# Patient Record
Sex: FEMALE | Race: WHITE | NOT HISPANIC OR LATINO
[De-identification: names, ages, dates, MRNs, and addresses within clinical notes are randomized per-mention and may not be internally consistent; named-entity substitution may affect disease eponyms.]

---

## 2023-05-08 ENCOUNTER — APPOINTMENT (OUTPATIENT)
Dept: ORTHOPEDIC SURGERY | Facility: CLINIC | Age: 48
End: 2023-05-08
Payer: COMMERCIAL

## 2023-05-08 DIAGNOSIS — Z86.2 PERSONAL HISTORY OF DISEASES OF THE BLOOD AND BLOOD-FORMING ORGANS AND CERTAIN DISORDERS INVOLVING THE IMMUNE MECHANISM: ICD-10-CM

## 2023-05-08 DIAGNOSIS — Z86.79 PERSONAL HISTORY OF OTHER DISEASES OF THE CIRCULATORY SYSTEM: ICD-10-CM

## 2023-05-08 PROCEDURE — 99214 OFFICE O/P EST MOD 30 MIN: CPT

## 2023-05-08 PROCEDURE — 73010 X-RAY EXAM OF SHOULDER BLADE: CPT | Mod: LT

## 2023-05-08 PROCEDURE — 73030 X-RAY EXAM OF SHOULDER: CPT | Mod: LT

## 2023-05-08 RX ORDER — METHYLPREDNISOLONE 4 MG/1
4 TABLET ORAL
Qty: 1 | Refills: 0 | Status: ACTIVE | COMMUNITY
Start: 2023-05-08 | End: 1900-01-01

## 2023-05-08 RX ORDER — LABETALOL HYDROCHLORIDE 300 MG/1
TABLET, FILM COATED ORAL
Refills: 0 | Status: ACTIVE | COMMUNITY

## 2023-05-08 NOTE — PHYSICAL EXAM
[Left] : left shoulder [Moderate] : moderate [Mild] : mild [5 ___] : forward flexion 5[unfilled]/5 [5___] : external rotation 5[unfilled]/5 [] : no sensory deficits [Right] : right shoulder [Sitting] : sitting [FreeTextEntry3] : There is a 1cm posterior scapular spine cyst. Nontender and nonfixed.  [FreeTextEntry9] : IR to T12. [de-identified] : There is a negative Phalen [TWNoteComboBox4] : passive forward flexion 165 degrees [TWNoteComboBox6] : internal rotation L2 [de-identified] : external rotation 70 degrees

## 2023-05-08 NOTE — HISTORY OF PRESENT ILLNESS
[10] : 10 [1] : 2 [Sleep] : sleep [Rest] : rest [Meds] : meds [Ice] : ice [] : no [FreeTextEntry1] : left shoulder  [FreeTextEntry5] : pt has been having ongoing left shoulder pain for awhile, did PT and it subsided for a bit but now the pain has returned  [FreeTextEntry7] : down to her elbow  [FreeTextEntry9] : voltaren gel, tyelnol or aleve used but not very helpful  [de-identified] : movement  [de-identified] : 06/2021 [de-identified] :   [de-identified] : 2021

## 2023-05-08 NOTE — CONSULT LETTER
[Dear  ___] : Dear  [unfilled], [Consult Letter:] : I had the pleasure of evaluating your patient, [unfilled]. [Please see my note below.] : Please see my note below. [Consult Closing:] : Thank you very much for allowing me to participate in the care of this patient.  If you have any questions, please do not hesitate to contact me. [Sincerely,] : Sincerely, [FreeTextEntry3] : Marcel Flores M.D.\par Shoulder Surgery

## 2023-05-08 NOTE — DATA REVIEWED
[FreeTextEntry1] : LEFT SHOULDER MRI 5/29/21:\par The calcium is noted. There is clear AC inflammation. The biceps does not have major changes. The muscle is good.

## 2023-05-08 NOTE — REASON FOR VISIT
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 She was seen in 2021 for calcific tendinitis, impingement, biceps tendonitis, and AC arthralgia. Surgery was indicated at the time however she did not want to proceed. She did PT in 2021. Reaching is painful. Night symptoms can occur. There is intermittent n/t. NSAID use as needed with temporary relief.

## 2023-05-08 NOTE — ASSESSMENT
[FreeTextEntry1] : We discussed the underlying pathology. \par Treatment options reviewed. \par There are surgical options. \par She has tried PT and injections in the past. \par Surgery was indicated in the past, she had been considering this. \par Surgery is now again reconsidered. \par Pre-operative PT is prescribed. \par An MRI is planned. \par MDP is prescribed. \par She will follow up in 1 week.\par Cautions discussed. \par Questions answered. \par \par Patient seen by Marecl Baer M.D.\par Entered by Elodia Mcguire acting as scribe.

## 2023-05-12 ENCOUNTER — FORM ENCOUNTER (OUTPATIENT)
Age: 48
End: 2023-05-12

## 2023-05-13 ENCOUNTER — APPOINTMENT (OUTPATIENT)
Dept: MRI IMAGING | Facility: CLINIC | Age: 48
End: 2023-05-13
Payer: COMMERCIAL

## 2023-05-13 PROCEDURE — 73221 MRI JOINT UPR EXTREM W/O DYE: CPT | Mod: LT

## 2023-05-22 ENCOUNTER — APPOINTMENT (OUTPATIENT)
Dept: ORTHOPEDIC SURGERY | Facility: CLINIC | Age: 48
End: 2023-05-22
Payer: COMMERCIAL

## 2023-05-22 VITALS — BODY MASS INDEX: 39.25 KG/M2 | WEIGHT: 265 LBS | HEIGHT: 69 IN

## 2023-05-22 PROCEDURE — 99214 OFFICE O/P EST MOD 30 MIN: CPT

## 2023-05-24 NOTE — ASSESSMENT
[FreeTextEntry1] : We discussed the MRI findings. We reviewed nonoperative vs operative considerations.  She has tried PT and injections in the past.  She does not want to repeat.  She would like to discuss surgery.\par \par We discussed treatment options, both non-operative and operative.  I do think she is a candidate for surgery.  Pain relief is a goal as well as improving function and motion.  I reviewed surgical techniques pictorially in the books that I co-edited.\par \par Interscalene anesthesia, general anesthesia and postoperative pain management were discussed.  The importance of physical therapy postoperatively, the gradual recovery and the rehabilitation program with initial driving restrictions were noted.  The use of a Cryo-Cuff by [employer] and a sling for functional recovery was reviewed.  She understands there are no guarantees.  The benefits of decreased pain, increased function and restoring anatomy were outlined.  The risks were reviewed including, but not limited to, infection, failure, bleeding, stiffness, pain, clotting, fracture, re-tear, hardware failure, deformity, functional limitation, scarring, neurovascular compromise, and narcotic use issues.  Under certain circumstances we discussed, further surgery may be indicated.\par \par She understands that 100% recovery is not expected, and the desired level of function may not be achievable.  The complicated nature of her condition, including the tear pattern, was noted.  We discussed the potential for a prolonged recovery course and the potential for this to affect her activities, which could include a work regimen.  Her questions were answered.  Other opinions can be pursued, as we discussed.\par \par She does wish to proceed with surgery.  This would include a left shoulder arthroscopy, debridement, synovectomy, calcium release, cuff repair, possible decompression.  Pre-op PT is advised.  Medical clearance is planned.  We will schedule this at the earliest mutual convenient time.\par \par \par \par Questions answered. \par \par Patient seen by Marcel Baer M.D.\par Entered by Elodia Mcguire acting as scribe.

## 2023-05-24 NOTE — PHYSICAL EXAM
[Left] : left shoulder [Moderate] : moderate [Mild] : mild [5 ___] : forward flexion 5[unfilled]/5 [5___] : external rotation 5[unfilled]/5 [Right] : right shoulder [Sitting] : sitting [] : no sensory deficits [FreeTextEntry3] : There is a 1cm posterior scapular spine cyst. Nontender and nonfixed.  [FreeTextEntry9] : IR to T12. [de-identified] : There is a negative Phalen [TWNoteComboBox4] : passive forward flexion 165 degrees [TWNoteComboBox6] : internal rotation L2 [de-identified] : external rotation 70 degrees

## 2023-05-24 NOTE — CONSULT LETTER
[Dear  ___] : Dear  [unfilled], [Courtesy Letter:] : I had the pleasure of seeing your patient, [unfilled], in my office today. [Sincerely,] : Sincerely, [FreeTextEntry1] : Please see my note below.  If you have any questions, please do not hesitate to contact me. [FreeTextEntry3] : Marcel Flores M.D.\par Shoulder Surgery

## 2023-05-24 NOTE — DATA REVIEWED
[FreeTextEntry1] : LEFT SHOULDER MRI 5/29/21:\par The calcium is noted. There is clear AC inflammation. The biceps does not have major changes. The muscle is good.\par \par LEFT SHOULDER MRI 5/13/23:\par The calcium is enlarged from 5mm to 15mm. The biceps seems ok. The muscle is decent. \par \par LEFT SHOULDER XR 5/8/23:\par  A calcium deposit is noted. It is multiloculated.

## 2023-05-24 NOTE — HISTORY OF PRESENT ILLNESS
[8] : 8 [3] : 3 [de-identified] : pt is here today for a test follow up for her left shoulder. pt states her pain is similar to last visit  [FreeTextEntry1] : left shoulder  [de-identified] : icing and MDP used

## 2023-06-19 ENCOUNTER — APPOINTMENT (OUTPATIENT)
Dept: ORTHOPEDIC SURGERY | Facility: CLINIC | Age: 48
End: 2023-06-19
Payer: COMMERCIAL

## 2023-06-29 ENCOUNTER — OUTPATIENT (OUTPATIENT)
Dept: OUTPATIENT SERVICES | Facility: HOSPITAL | Age: 48
LOS: 1 days | End: 2023-06-29
Payer: COMMERCIAL

## 2023-06-29 ENCOUNTER — APPOINTMENT (OUTPATIENT)
Dept: ORTHOPEDIC SURGERY | Facility: CLINIC | Age: 48
End: 2023-06-29
Payer: COMMERCIAL

## 2023-06-29 VITALS
DIASTOLIC BLOOD PRESSURE: 89 MMHG | SYSTOLIC BLOOD PRESSURE: 154 MMHG | OXYGEN SATURATION: 98 % | TEMPERATURE: 97 F | HEART RATE: 84 BPM | WEIGHT: 268.08 LBS | HEIGHT: 68 IN | RESPIRATION RATE: 16 BRPM

## 2023-06-29 VITALS — WEIGHT: 265 LBS | BODY MASS INDEX: 39.25 KG/M2 | HEIGHT: 69 IN

## 2023-06-29 DIAGNOSIS — I10 ESSENTIAL (PRIMARY) HYPERTENSION: ICD-10-CM

## 2023-06-29 DIAGNOSIS — M75.32 CALCIFIC TENDINITIS OF LEFT SHOULDER: ICD-10-CM

## 2023-06-29 LAB
ANION GAP SERPL CALC-SCNC: 13 MMOL/L — SIGNIFICANT CHANGE UP (ref 7–14)
BUN SERPL-MCNC: 9 MG/DL — SIGNIFICANT CHANGE UP (ref 7–23)
CALCIUM SERPL-MCNC: 9.2 MG/DL — SIGNIFICANT CHANGE UP (ref 8.4–10.5)
CHLORIDE SERPL-SCNC: 100 MMOL/L — SIGNIFICANT CHANGE UP (ref 98–107)
CO2 SERPL-SCNC: 25 MMOL/L — SIGNIFICANT CHANGE UP (ref 22–31)
CREAT SERPL-MCNC: 0.65 MG/DL — SIGNIFICANT CHANGE UP (ref 0.5–1.3)
EGFR: 110 ML/MIN/1.73M2 — SIGNIFICANT CHANGE UP
GLUCOSE SERPL-MCNC: 110 MG/DL — HIGH (ref 70–99)
HCG UR QL: NEGATIVE — SIGNIFICANT CHANGE UP
HCT VFR BLD CALC: 38.1 % — SIGNIFICANT CHANGE UP (ref 34.5–45)
HGB BLD-MCNC: 11.3 G/DL — LOW (ref 11.5–15.5)
MCHC RBC-ENTMCNC: 24 PG — LOW (ref 27–34)
MCHC RBC-ENTMCNC: 29.7 GM/DL — LOW (ref 32–36)
MCV RBC AUTO: 80.9 FL — SIGNIFICANT CHANGE UP (ref 80–100)
NRBC # BLD: 0 /100 WBCS — SIGNIFICANT CHANGE UP (ref 0–0)
NRBC # FLD: 0 K/UL — SIGNIFICANT CHANGE UP (ref 0–0)
PLATELET # BLD AUTO: 449 K/UL — HIGH (ref 150–400)
POTASSIUM SERPL-MCNC: 3.6 MMOL/L — SIGNIFICANT CHANGE UP (ref 3.5–5.3)
POTASSIUM SERPL-SCNC: 3.6 MMOL/L — SIGNIFICANT CHANGE UP (ref 3.5–5.3)
RBC # BLD: 4.71 M/UL — SIGNIFICANT CHANGE UP (ref 3.8–5.2)
RBC # FLD: 16.4 % — HIGH (ref 10.3–14.5)
SODIUM SERPL-SCNC: 138 MMOL/L — SIGNIFICANT CHANGE UP (ref 135–145)
WBC # BLD: 10.7 K/UL — HIGH (ref 3.8–10.5)
WBC # FLD AUTO: 10.7 K/UL — HIGH (ref 3.8–10.5)

## 2023-06-29 PROCEDURE — 99214 OFFICE O/P EST MOD 30 MIN: CPT

## 2023-06-29 PROCEDURE — 93010 ELECTROCARDIOGRAM REPORT: CPT

## 2023-06-29 NOTE — PHYSICAL EXAM
[de-identified] : R hand: \par Tender volar wrist \par Good finger ROM \par +Tinels \par +Phalens \par +Compression test \par Decreased sensation median nerve distribution\par +thenar atrophy\par \par L hand: \par Tender volar wrist \par Good finger ROM \par +Tinels \par +Phalens \par +Compression test \par Decreased sensation median nerve distribution\par +thenar atrophy\par \par

## 2023-06-29 NOTE — H&P PST ADULT - NSICDXPASTSURGICALHX_GEN_ALL_CORE_FT
PAST SURGICAL HISTORY:  Exploratory Laparoscopy for Infertility     H/O dilation and curettage hysteroscopy and polypectomy     H/O:      in Vitro Fertilization  &     Status Post  Section (Primary) 2007    Uterine Polyp Excision . "benign"

## 2023-06-29 NOTE — H&P PST ADULT - MUSCULOSKELETAL
limited extension over head , pain with palpation/decreased ROM due to pain/normal gait details… limited extension over head , pain with palpation anteriorly/decreased ROM due to pain/normal gait/strength 5/5 bilateral upper extremities

## 2023-06-29 NOTE — H&P PST ADULT - PROBLEM SELECTOR PLAN 1
Patient scheduled for surgery on: 7/11/23  Patient provided with verbal and written presurgical instructions  Verbalized understanding  with teach back on the following: Famotidine for GI prophylaxis with small sip of water and  Chlorhexidine wash    Lab specimen drawn at PST today: cbc, bmp, ucg    Problem: Pre-menopausal   Urine specimen cup provided, UCG on DOS

## 2023-06-29 NOTE — H&P PST ADULT - HISTORY OF PRESENT ILLNESS
47 yr old female presents with preop dx calcific tendinitis scheduled for left shoulder arthroscopy debridement synovectomy, calcium release, rotator cuff repair, reports limited ROM reaching upwards, elbow radiation and numbness in hands , pain present for several years with worsening of symptoms

## 2023-06-29 NOTE — H&P PST ADULT - NSICDXPASTMEDICALHX_GEN_ALL_CORE_FT
PAST MEDICAL HISTORY:  Calcific tendinitis of left shoulder     H/O urinary tract infection     Heart murmur     HTN (hypertension)     Infertility     Obesity     Polycystic ovarian disease     Uterine polyp     Vertigo

## 2023-06-29 NOTE — HISTORY OF PRESENT ILLNESS
[6] : 6 [5] : 5 [Dull/Aching] : dull/aching [Tingling] : tingling [Ice] : ice [de-identified] : Bilateral hand numbness \par Getting worse\par R is about the same as L\par \par Going on for years [] : no [FreeTextEntry1] : hands [FreeTextEntry3] : few months ago [FreeTextEntry5] : no injury\par H/O mild cts\par has N/T\par  [de-identified] : activity [de-identified] : many years ago -MD

## 2023-07-01 ENCOUNTER — TRANSCRIPTION ENCOUNTER (OUTPATIENT)
Age: 48
End: 2023-07-01

## 2023-07-05 RX ORDER — KETOROLAC TROMETHAMINE 10 MG/1
10 TABLET, FILM COATED ORAL EVERY 6 HOURS
Qty: 20 | Refills: 0 | Status: ACTIVE | COMMUNITY
Start: 2023-07-05 | End: 1900-01-01

## 2023-07-05 RX ORDER — HYDROCODONE BITARTRATE AND ACETAMINOPHEN 7.5; 325 MG/1; MG/1
7.5-325 TABLET ORAL
Qty: 42 | Refills: 0 | Status: ACTIVE | COMMUNITY
Start: 2023-07-05 | End: 1900-01-01

## 2023-07-06 ENCOUNTER — APPOINTMENT (OUTPATIENT)
Dept: NEUROLOGY | Facility: CLINIC | Age: 48
End: 2023-07-06
Payer: COMMERCIAL

## 2023-07-06 DIAGNOSIS — R20.0 ANESTHESIA OF SKIN: ICD-10-CM

## 2023-07-06 PROCEDURE — 95912 NRV CNDJ TEST 11-12 STUDIES: CPT

## 2023-07-06 PROCEDURE — 95886 MUSC TEST DONE W/N TEST COMP: CPT | Mod: NC

## 2023-07-10 ENCOUNTER — TRANSCRIPTION ENCOUNTER (OUTPATIENT)
Age: 48
End: 2023-07-10

## 2023-07-10 ENCOUNTER — APPOINTMENT (OUTPATIENT)
Dept: ORTHOPEDIC SURGERY | Facility: CLINIC | Age: 48
End: 2023-07-10
Payer: COMMERCIAL

## 2023-07-10 VITALS — HEIGHT: 69 IN | BODY MASS INDEX: 39.25 KG/M2 | WEIGHT: 265 LBS

## 2023-07-10 DIAGNOSIS — G56.02 CARPAL TUNNEL SYNDROME, LEFT UPPER LIMB: ICD-10-CM

## 2023-07-10 DIAGNOSIS — G56.01 CARPAL TUNNEL SYNDROME, RIGHT UPPER LIMB: ICD-10-CM

## 2023-07-10 PROCEDURE — 99214 OFFICE O/P EST MOD 30 MIN: CPT

## 2023-07-10 RX ORDER — HYDROCODONE BITARTRATE AND ACETAMINOPHEN 5; 325 MG/1; MG/1
5-325 TABLET ORAL
Qty: 42 | Refills: 0 | Status: ACTIVE | COMMUNITY
Start: 2023-07-10 | End: 1900-01-01

## 2023-07-10 NOTE — ASSESSMENT
[FreeTextEntry1] : I discussed CTR and injections\par She is having shoulder surgery tomorrow with Ticker\par Will try braces for now \par Return prn

## 2023-07-10 NOTE — ASU DISCHARGE PLAN (ADULT/PEDIATRIC) - CARE PROVIDER_API CALL
Marcel Baer  Orthopaedic Surgery  [address] [place] [id number]  Phone: [phone]  Fax: [phone]  Follow Up Time: 1 week

## 2023-07-10 NOTE — HISTORY OF PRESENT ILLNESS
[2] : 2 [Dull/Aching] : dull/aching [Tingling] : tingling [Intermittent] : intermittent [de-identified] : EMG shows bilateral mod CTS\par \par  [] : Post Surgical Visit: no [FreeTextEntry1] : Raul hands [FreeTextEntry5] : here for emg results [FreeTextEntry6] : numbness

## 2023-07-10 NOTE — PHYSICAL EXAM
[de-identified] : R hand: \par Tender volar wrist \par Good finger ROM \par +Tinels \par +Phalens \par +Compression test \par Decreased sensation median nerve distribution\par +thenar atrophy\par \par L hand: \par Tender volar wrist \par Good finger ROM \par +Tinels \par +Phalens \par +Compression test \par Decreased sensation median nerve distribution\par +thenar atrophy\par \par

## 2023-07-10 NOTE — ASU DISCHARGE PLAN (ADULT/PEDIATRIC) - ASU DC SPECIAL INSTRUCTIONSFT
Please see handout from Dr. Baer for specific instructions including follow up. Your medications have already been sent to your pharmacy.

## 2023-07-10 NOTE — ASU DISCHARGE PLAN (ADULT/PEDIATRIC) - NS MD DC FALL RISK RISK
For information on Fall & Injury Prevention, visit: [email or web address] OR  [email or web address] OR  https://www.cdc.gov/steadi/patient.html

## 2023-07-11 ENCOUNTER — APPOINTMENT (OUTPATIENT)
Dept: ORTHOPEDIC SURGERY | Facility: AMBULATORY SURGERY CENTER | Age: 48
End: 2023-07-11
Payer: COMMERCIAL

## 2023-07-11 ENCOUNTER — OUTPATIENT (OUTPATIENT)
Dept: OUTPATIENT SERVICES | Facility: HOSPITAL | Age: 48
LOS: 1 days | Discharge: HOME | End: 2023-07-11

## 2023-07-11 VITALS
TEMPERATURE: 98 F | RESPIRATION RATE: 18 BRPM | WEIGHT: 268.08 LBS | HEIGHT: 68 IN | HEART RATE: 87 BPM | DIASTOLIC BLOOD PRESSURE: 84 MMHG | SYSTOLIC BLOOD PRESSURE: 143 MMHG | OXYGEN SATURATION: 100 %

## 2023-07-11 VITALS
HEART RATE: 70 BPM | DIASTOLIC BLOOD PRESSURE: 61 MMHG | RESPIRATION RATE: 17 BRPM | SYSTOLIC BLOOD PRESSURE: 131 MMHG | TEMPERATURE: 97 F | OXYGEN SATURATION: 97 %

## 2023-07-11 DIAGNOSIS — M75.32 CALCIFIC TENDINITIS OF LEFT SHOULDER: ICD-10-CM

## 2023-07-11 PROCEDURE — 23000 RMVL SBDLTD CLCREOUS DEP OPN: CPT | Mod: 59,LT

## 2023-07-11 PROCEDURE — 29827 SHO ARTHRS SRG RT8TR CUF RPR: CPT | Mod: LT

## 2023-07-11 PROCEDURE — 29823 SHO ARTHRS SRG XTNSV DBRDMT: CPT | Mod: 59,LT

## 2023-07-11 RX ORDER — LABETALOL HCL 100 MG
1 TABLET ORAL
Refills: 0 | DISCHARGE

## 2023-07-11 NOTE — ASU PREOPERATIVE ASSESSMENT, ADULT (IPARK ONLY) - FALL HARM RISK - HARM RISK INTERVENTIONS

## 2023-07-11 NOTE — ASU PREOPERATIVE ASSESSMENT, ADULT (IPARK ONLY) - TEACHING/LEARNING EDUCATIONAL LEVEL
Call Attempt #2    Phone call attempt to schedule patient for breast cancer screening. Unable to reach patient. Voicemail box is full.
[facility]

## 2023-07-21 ENCOUNTER — APPOINTMENT (OUTPATIENT)
Dept: ORTHOPEDIC SURGERY | Facility: CLINIC | Age: 48
End: 2023-07-21
Payer: COMMERCIAL

## 2023-07-21 VITALS — WEIGHT: 265 LBS | BODY MASS INDEX: 39.25 KG/M2 | HEIGHT: 69 IN

## 2023-07-21 PROBLEM — M75.32 CALCIFIC TENDINITIS OF LEFT SHOULDER: Chronic | Status: ACTIVE | Noted: 2023-06-29

## 2023-07-21 PROBLEM — I10 ESSENTIAL (PRIMARY) HYPERTENSION: Chronic | Status: ACTIVE | Noted: 2023-06-29

## 2023-07-21 PROCEDURE — 73030 X-RAY EXAM OF SHOULDER: CPT | Mod: LT

## 2023-07-21 PROCEDURE — 99024 POSTOP FOLLOW-UP VISIT: CPT

## 2023-07-21 RX ORDER — METHYLPREDNISOLONE 4 MG/1
4 TABLET ORAL
Qty: 1 | Refills: 0 | Status: ACTIVE | COMMUNITY
Start: 2023-07-21 | End: 1900-01-01

## 2023-07-21 RX ORDER — GABAPENTIN 300 MG/1
300 CAPSULE ORAL 3 TIMES DAILY
Qty: 15 | Refills: 0 | Status: ACTIVE | COMMUNITY
Start: 2023-07-05 | End: 1900-01-01

## 2023-07-21 NOTE — PHYSICAL EXAM
[Left] : left shoulder [Supine] : supine [Moderate] : moderate [] : no sensory deficits [de-identified] : Strength was not assessed. [TWNoteComboBox4] : passive forward flexion 120 degrees [de-identified] : external rotation 45 degrees

## 2023-07-21 NOTE — REASON FOR VISIT
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  She has had 2 sessions of PT.  She is in her sling.  The Norco doesn't do much but cause side effects.  The Toradol and Gabapentin are most helpful.

## 2023-07-21 NOTE — HISTORY OF PRESENT ILLNESS
[] : Post Surgical Visit: yes [de-identified] : pt is here today for her first post op visit, doing well.  [de-identified] : physical therapy  [de-identified] : 07/11/2023 [de-identified] : left shoulder arthroscopy

## 2023-07-21 NOTE — ASSESSMENT
[FreeTextEntry1] : We reviewed the scope pictures.\par PT outlined.\par Sling use outlined.\par Questions answered.\par The Gabapentin was renewed.\par NSAIDs planned.\par \par Patient was seen by Dr. Marcel Baer.\par Patient was seen by Serene CHAVEZ under the supervision of Dr. Marcel Baer.\par Progress note was completed by Serene CHAVEZ.

## 2023-08-25 ENCOUNTER — APPOINTMENT (OUTPATIENT)
Dept: ORTHOPEDIC SURGERY | Facility: CLINIC | Age: 48
End: 2023-08-25
Payer: COMMERCIAL

## 2023-08-25 PROCEDURE — 99024 POSTOP FOLLOW-UP VISIT: CPT

## 2023-08-25 RX ORDER — NAPROXEN 500 MG/1
500 TABLET ORAL
Qty: 60 | Refills: 0 | Status: ACTIVE | COMMUNITY
Start: 2023-08-25 | End: 1900-01-01

## 2023-08-25 NOTE — REASON FOR VISIT
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  DOS: 7/11/2023 Procedure: Left Shoulder Arthroscopy, Glenohumeral Debridement, Calcium Release, Rotator Cuff Repair Diagnosis: Calcific Tendonitis, Partial Rotator Cuff Tear, Shoulder Pain, Glenohumeral Synovitis, SLAP Tear, Partial Subscapularis Tear, Partial Superior Glenohumeral Ligament Tear  She is doing well with therapy.  She is not wearing her sling all the time.  She goes to PT.  Her  is here.

## 2023-08-25 NOTE — PHYSICAL EXAM
[Left] : left shoulder [Sitting] : sitting [Mild] : mild [] : no sensory deficits [de-identified] : Strength was not assessed. [TWNoteComboBox4] : passive forward flexion 150 degrees [de-identified] : external rotation 60 degrees

## 2023-08-25 NOTE — ASSESSMENT
[FreeTextEntry1] : We reviewed her course. She is doing well. Consistent Aleve use outlined. She will d/c the sling completely. Prescribed Naprosyn.  PT will continue. Cautions with house activities advised.   Patient was seen by Dr. Marcel Baer. Patient was seen by Serene CHAVEZ under the supervision of Dr. Marcel Baer. Progress note was completed by Serene CHAVEZ.

## 2023-08-25 NOTE — HISTORY OF PRESENT ILLNESS
[] : Post Surgical Visit: yes [de-identified] : Pt is here for post op visit s/p left shoulder arthroscopy. Doing well. Going to PT 3x weekly, doing HEP.  [FreeTextEntry1] : left shoulder [de-identified] : PT, sling, gabapentin [de-identified] : 7/11/23 [de-identified] : left shoulder arthroscopy

## 2023-10-16 ENCOUNTER — APPOINTMENT (OUTPATIENT)
Dept: ORTHOPEDIC SURGERY | Facility: CLINIC | Age: 48
End: 2023-10-16
Payer: COMMERCIAL

## 2023-10-16 PROCEDURE — 99214 OFFICE O/P EST MOD 30 MIN: CPT

## 2023-11-13 ENCOUNTER — APPOINTMENT (OUTPATIENT)
Dept: ORTHOPEDIC SURGERY | Facility: CLINIC | Age: 48
End: 2023-11-13
Payer: COMMERCIAL

## 2023-11-13 VITALS — WEIGHT: 265 LBS | HEIGHT: 69 IN | BODY MASS INDEX: 39.25 KG/M2

## 2023-11-13 DIAGNOSIS — M75.42 IMPINGEMENT SYNDROME OF LEFT SHOULDER: ICD-10-CM

## 2023-11-13 DIAGNOSIS — M75.112 INCOMPLETE ROTATOR CUFF TEAR OR RUPTURE OF LEFT SHOULDER, NOT SPECIFIED AS TRAUMATIC: ICD-10-CM

## 2023-11-13 DIAGNOSIS — M75.22 BICIPITAL TENDINITIS, LEFT SHOULDER: ICD-10-CM

## 2023-11-13 DIAGNOSIS — M75.32 CALCIFIC TENDINITIS OF LEFT SHOULDER: ICD-10-CM

## 2023-11-13 PROCEDURE — 99214 OFFICE O/P EST MOD 30 MIN: CPT

## 2024-01-22 ENCOUNTER — APPOINTMENT (OUTPATIENT)
Dept: ORTHOPEDIC SURGERY | Facility: CLINIC | Age: 49
End: 2024-01-22

## (undated) DEVICE — ELCTR S&N SWITCHPEN WITH L-HOOK FOR FLUID

## (undated) DEVICE — ELCTR WAND AMBIENT MEGA 90DRG

## (undated) DEVICE — SUT PROLENE 0 30" CT-1

## (undated) DEVICE — CAM-ESU 1501291: Type: DURABLE MEDICAL EQUIPMENT

## (undated) DEVICE — CANNULA S&N ARTHROSCOPY W OBTURATOR 8MM

## (undated) DEVICE — DRSG TAPE MICROFOAM 4"

## (undated) DEVICE — PACK SHOULDER

## (undated) DEVICE — POSITIONER PATIENT SAFETY STRAP 3X60"

## (undated) DEVICE — VENODYNE/SCD SLEEVE CALF MEDIUM

## (undated) DEVICE — DRSG CURITY GAUZE SPONGE 4 X 4" 12-PLY

## (undated) DEVICE — DRSG STERISTRIPS 0.5 X 4"

## (undated) DEVICE — SUT MONOCRYL 3-0 18" PS-2 UNDYED

## (undated) DEVICE — SOL IRR BAG NS 0.9% 3000ML

## (undated) DEVICE — GLV 7.5 PROTEXIS (WHITE)

## (undated) DEVICE — GLV 8 PROTEXIS (BLUE)

## (undated) DEVICE — KNIFE S&N ACUFEX BANANA SERRATED 3MM STRAIGHT

## (undated) DEVICE — NDL SPINAL 18G X 3.5" (PINK)

## (undated) DEVICE — BUR S&N STONECUTTER ELITE 4MM STRAIGHT (MAROON)

## (undated) DEVICE — SYR LUER LOK 50CC

## (undated) DEVICE — CANNULA LINVATEC SHOULDER 7CM (GREY & ORANGE)

## (undated) DEVICE — DRAPE SURGICAL #1010

## (undated) DEVICE — TUBING DEPUY MITEK FMS OUTFLOW

## (undated) DEVICE — DRAPE MAYO STAND 23"

## (undated) DEVICE — POSITIONER S&N FACE MASK SPIDER 2

## (undated) DEVICE — SHAVER BLADE S&N FULL RADIUS BONECUTTER PLATINUM 4.5MM (YELLOW)

## (undated) DEVICE — ELCTR ROCKER SWITCH PENCIL BLUE 10FT

## (undated) DEVICE — WARMING BLANKET LOWER ADULT

## (undated) DEVICE — TUBING DEPUY MITEK FMS INFLOW